# Patient Record
(demographics unavailable — no encounter records)

---

## 2025-06-02 NOTE — PLAN
[FreeTextEntry1] : Health Maintenance: 29 year old female pt presents for annual gyn exam BSE taught Reviewed diet and exercise Breast and pelvic exam performed Pt is not sexually active- pap not conducted Advised pt to see PCP annually  5cm left labial cyst ?Bartholin's cyst, non infected, non erythematous:  bartholins vs labial cyst vs gardeners duct VS HERIA OF BOWEL?? To better delineate the nature of the cyst, rx given for MRI Discussed options for incision vs drainage at office vs surgical removal of cyst. R/B/A discussed.  Pt declines at this time, cyst does not bother her at this time. Pt to call if would like to remove it.    RTO in 1 year for annual or PRN  Fransisco LI am scribing for and in the presence of ALEX Garber M.D. in the following sections: HISTORY OF PRESENT ILLNESS, PAST MEDICAL/FAMILY/SOCIAL HISTORY, REVIEW OF SYSTEMS, PHYSICAL EXAM, ASSESSMENT/PLAN.

## 2025-06-02 NOTE — HISTORY OF PRESENT ILLNESS
[No] : Patient does not have concerns regarding sex [Regular Cycle Intervals] : periods have been regular [Menarche Age: ____] : age at menarche was [unfilled] [FreeTextEntry1] : 5/16/2025

## 2025-06-02 NOTE — PHYSICAL EXAM
[Chaperoned Physical Exam] : A chaperone was present in the examining room during all aspects of the physical examination. [Appropriately responsive] : appropriately responsive [Alert] : alert [No Acute Distress] : no acute distress [No Lymphadenopathy] : no lymphadenopathy [Regular Rate Rhythm] : regular rate rhythm [No Murmurs] : no murmurs [Clear to Auscultation B/L] : clear to auscultation bilaterally [Soft] : soft [Non-tender] : non-tender [Non-distended] : non-distended [No HSM] : No HSM [No Lesions] : no lesions [No Mass] : no mass [Oriented x3] : oriented x3 [Examination Of The Breasts] : a normal appearance [No Masses] : no breast masses were palpable [Labia Minora] : normal [Normal] : normal [Uterine Adnexae] : normal [Labia Majora] : normal on the right [Bartholin's Gland] : the right Bartholin's gland was normal [FreeTextEntry1] : omkar [Bartholin's Gland Inflamed] : no nflammation [___] : no abscess [FreeTextEntry2] : 5cm ?bartholin's cyst on left labia, nontender, easy to compress. fluctuant and soft

## 2025-06-23 NOTE — HISTORY OF PRESENT ILLNESS
[FreeTextEntry1] : CPE [de-identified] : 28yo F w/ depression, high functioning autism, labial cyst presents to establish care and for CPE. Currently has no acute complaints but states she might have right ear hearing loss. She was diagnosed with depression/anxiety in high school after her father passed. Currently feels depressed as her mom recently passed away. She used to see a therapist but stopped after moving to [facility]. States her BP are usually high at doctor's office. Checks her BP at home - usually in 120/80's. LMP: 06/16/25; regular cycle.   Meds: none PSHx: none  Fam hx: unknown- adopted Allergies; amoxicillin; rash when patient was a child  Vaccinations: UTD w/ flu vaccine. UTD w/ TDAP (2020)  Recently went to gyn; no pap smear was needed as patient not sexually active. Patient was given MRI script for vaginal cysts.  Patient recently went to dentist in may.  Has not gone to optometry in years; will make appt Likes to do cardio exercise 5x per week.  Eats mostly healthy.

## 2025-06-23 NOTE — HISTORY OF PRESENT ILLNESS
[FreeTextEntry1] : CPE [de-identified] : 28yo F w/ depression, high functioning autism, labial cyst presents to establish care and for CPE. Currently has no acute complaints but states she might have right ear hearing loss. She was diagnosed with depression/anxiety in high school after her father passed. Currently feels depressed as her mom recently passed away. She used to see a therapist but stopped after moving to [facility]. States her BP are usually high at doctor's office. Checks her BP at home - usually in 120/80's. LMP: 06/16/25; regular cycle.   Meds: none PSHx: none  Fam hx: unknown- adopted Allergies; amoxicillin; rash when patient was a child  Vaccinations: UTD w/ flu vaccine. UTD w/ TDAP (2020)  Recently went to gyn; no pap smear was needed as patient not sexually active. Patient was given MRI script for vaginal cysts.  Patient recently went to dentist in may.  Has not gone to optometry in years; will make appt Likes to do cardio exercise 5x per week.  Eats mostly healthy.

## 2025-06-23 NOTE — HEALTH RISK ASSESSMENT
[Good] : ~his/her~  mood as  good [2 - 4 times a month (2 pts)] : 2-4 times a month (2 points) [1 or 2 (0 pts)] : 1 or 2 (0 points) [Never (0 pts)] : Never (0 points) [Little interest or pleasure doing things] : 1) Little interest or pleasure doing things [1] : 1) Little interest or pleasure doing things for several days (1) [Feeling down, depressed, or hopeless] : 2) Feeling down, depressed, or hopeless [2] : 2) Feeling down, depressed, or hopeless for more than half of the days (2) [PHQ-2 Positive] : PHQ-2 Positive [With Family] : lives with family [Employed] : employed [Fully functional (bathing, dressing, toileting, transferring, walking, feeding)] : Fully functional (bathing, dressing, toileting, transferring, walking, feeding) [Fully functional (using the telephone, shopping, preparing meals, housekeeping, doing laundry, using] : Fully functional and needs no help or supervision to perform IADLs (using the telephone, shopping, preparing meals, housekeeping, doing laundry, using transportation, managing medications and managing finances) [Reports changes in hearing] : Reports changes in hearing [Smoke Detector] : smoke detector [Carbon Monoxide Detector] : carbon monoxide detector [Never] : Never [Yes] : Yes [No] : In the past 12 months have you used drugs other than those required for medical reasons? No [No falls in past year] : Patient reported no falls in the past year [1/2 of Days or More (2)] : 2.) Feeling down, depressed or hopeless? Half the days or more [Several Days (1)] : 7.) Trouble concentrating on things, such as reading a newspaper or watching television? Several days [Not at All (0)] : 9.) Thoughts that you would be off dead or of hurting yourself in some way? Not at all [Mild] : Severity of Depression is Mild [Somewhat Difficult] : How difficult have these problems made it for you to do your work, take care of things at home, or get along with people? Somewhat difficult [I have developed a follow-up plan documented below in the note.] : I have developed a follow-up plan documented below in the note. [HIV test declined] : HIV test declined [Hepatitis C test declined] : Hepatitis C test declined [Significant Other] : lives with significant other [Feels Safe at Home] : Feels safe at home [Seat Belt] :  uses seat belt [Patient/Caregiver not ready to engage] : , patient/caregiver not ready to engage [Audit-CScore] : 2 [[id number]] : 3 [[id number]] : 5 [Change in mental status noted] : No change in mental status noted [Language] : denies difficulty with language [Behavior] : denies difficulty with behavior [Reasoning] : denies difficulty with reasoning [Sexually Active] : not sexually active [Reports changes in vision] : Reports no changes in vision [Reports changes in dental health] : Reports no changes in dental health [de-identified] : Works at [employer]  [de-identified] : right ear potential hearing loss

## 2025-06-23 NOTE — PHYSICAL EXAM
[No Acute Distress] : no acute distress [Well Nourished] : well nourished [Well Developed] : well developed [Normal Sclera/Conjunctiva] : normal sclera/conjunctiva [Normal Outer Ear/Nose] : the outer ears and nose were normal in appearance [No Respiratory Distress] : no respiratory distress  [No Accessory Muscle Use] : no accessory muscle use [Clear to Auscultation] : lungs were clear to auscultation bilaterally [Normal Rate] : normal rate  [Regular Rhythm] : with a regular rhythm [Normal S1, S2] : normal S1 and S2 [No Edema] : there was no peripheral edema [Soft] : abdomen soft [Non Tender] : non-tender [Non-distended] : non-distended [No CVA Tenderness] : no CVA  tenderness [No Joint Swelling] : no joint swelling [No Rash] : no rash [Normal Gait] : normal gait [Normal Affect] : the affect was normal [Alert and Oriented x3] : oriented to person, place, and time [No Focal Deficits] : no focal deficits

## 2025-06-23 NOTE — REVIEW OF SYSTEMS
[Depression] : depression [Fever] : no fever [Chills] : no chills [Fatigue] : no fatigue [Earache] : no earache [Chest Pain] : no chest pain [Palpitations] : no palpitations [Lower Ext Edema] : no lower extremity edema [Orthopnea] : no orthopnea [Shortness Of Breath] : no shortness of breath [Wheezing] : no wheezing [Cough] : no cough [Dyspnea on Exertion] : no dyspnea on exertion [Abdominal Pain] : no abdominal pain [Nausea] : no nausea [Constipation] : no constipation [Diarrhea] : diarrhea [Vomiting] : no vomiting [Dysuria] : no dysuria [Hematuria] : no hematuria [Joint Pain] : no joint pain [Muscle Pain] : no muscle pain [Itching] : no itching [Skin Rash] : no skin rash [Headache] : no headache

## 2025-06-23 NOTE — PAST MEDICAL HISTORY
[Menstruating] : menstruating [Normal Amount/Duration] : it was of a normal amount and duration [Normal Duration] : the duration was normal [de-identified] : 6/16

## 2025-06-23 NOTE — ASSESSMENT
[Vaccines Reviewed] : Immunizations reviewed today. Please see immunization details in the vaccine log within the immunization flowsheet.  [FreeTextEntry1] : #HCM - Will check routine fasting labs; script given - UTD w/ flu vaccine, UTD w/ TDAP 2020  - Recently saw gyn; no pap smear was needed - UTD w/ dental - Will make appt with optho; referral given   #Mild depression - Reports depression as her mom passed away recently - Recommend therapy at this time - Counseled on methods to relieve stress and anxiety  #Labial cyst - Recently saw gyn; MRI script was given for labial cyst  # Potential R. hearing loss - Will give ENT referral

## 2025-06-23 NOTE — PAST MEDICAL HISTORY
[Menstruating] : menstruating [Normal Amount/Duration] : it was of a normal amount and duration [Normal Duration] : the duration was normal [de-identified] : 6/16

## 2025-06-23 NOTE — HEALTH RISK ASSESSMENT
[Good] : ~his/her~  mood as  good [2 - 4 times a month (2 pts)] : 2-4 times a month (2 points) [1 or 2 (0 pts)] : 1 or 2 (0 points) [Never (0 pts)] : Never (0 points) [Little interest or pleasure doing things] : 1) Little interest or pleasure doing things [1] : 1) Little interest or pleasure doing things for several days (1) [Feeling down, depressed, or hopeless] : 2) Feeling down, depressed, or hopeless [2] : 2) Feeling down, depressed, or hopeless for more than half of the days (2) [PHQ-2 Positive] : PHQ-2 Positive [With Family] : lives with family [Employed] : employed [Fully functional (bathing, dressing, toileting, transferring, walking, feeding)] : Fully functional (bathing, dressing, toileting, transferring, walking, feeding) [Fully functional (using the telephone, shopping, preparing meals, housekeeping, doing laundry, using] : Fully functional and needs no help or supervision to perform IADLs (using the telephone, shopping, preparing meals, housekeeping, doing laundry, using transportation, managing medications and managing finances) [Reports changes in hearing] : Reports changes in hearing [Smoke Detector] : smoke detector [Carbon Monoxide Detector] : carbon monoxide detector [Never] : Never [Yes] : Yes [No] : In the past 12 months have you used drugs other than those required for medical reasons? No [No falls in past year] : Patient reported no falls in the past year [1/2 of Days or More (2)] : 2.) Feeling down, depressed or hopeless? Half the days or more [Several Days (1)] : 7.) Trouble concentrating on things, such as reading a newspaper or watching television? Several days [Not at All (0)] : 9.) Thoughts that you would be off dead or of hurting yourself in some way? Not at all [Mild] : Severity of Depression is Mild [Somewhat Difficult] : How difficult have these problems made it for you to do your work, take care of things at home, or get along with people? Somewhat difficult [I have developed a follow-up plan documented below in the note.] : I have developed a follow-up plan documented below in the note. [HIV test declined] : HIV test declined [Hepatitis C test declined] : Hepatitis C test declined [Significant Other] : lives with significant other [Feels Safe at Home] : Feels safe at home [Seat Belt] :  uses seat belt [Patient/Caregiver not ready to engage] : , patient/caregiver not ready to engage [Audit-CScore] : 2 [[id number]] : 3 [[id number]] : 5 [Change in mental status noted] : No change in mental status noted [Language] : denies difficulty with language [Behavior] : denies difficulty with behavior [Reasoning] : denies difficulty with reasoning [Sexually Active] : not sexually active [Reports changes in vision] : Reports no changes in vision [Reports changes in dental health] : Reports no changes in dental health [de-identified] : Works at [employer]  [de-identified] : right ear potential hearing loss

## 2025-07-23 NOTE — HISTORY OF PRESENT ILLNESS
[FreeTextEntry1] : NP mole  [de-identified] : 29 year old F presenting to clinic for following: Problem:  mole on R frontal scalp  Duration:  been there since she was a teenager  Associated symptoms/Aggravating Factors: noted to have texture changes  No other changing or concerning lesions.  No itchy, growing, bleeding, painful or changing moles.    Personal hx of skin cancer:  unclear, hx of being adopted   FHx of skin cancer:   Social hx:

## 2025-07-23 NOTE — ASSESSMENT
[FreeTextEntry1] : #Flesh colored verrucous plaque - R frontal scalp  DDX: verrucous SK vs congenital nevus, less likely epidermal nevus  - Discussed option for shave removal. Pt deferred for now and will RTC when interested    RTC PRN

## 2025-07-23 NOTE — HISTORY OF PRESENT ILLNESS
[FreeTextEntry1] : NP mole  [de-identified] : 29 year old F presenting to clinic for following: Problem:  mole on R frontal scalp  Duration:  been there since she was a teenager  Associated symptoms/Aggravating Factors: noted to have texture changes  No other changing or concerning lesions.  No itchy, growing, bleeding, painful or changing moles.    Personal hx of skin cancer:  unclear, hx of being adopted   FHx of skin cancer:   Social hx: